# Patient Record
Sex: MALE | Race: WHITE | URBAN - METROPOLITAN AREA
[De-identification: names, ages, dates, MRNs, and addresses within clinical notes are randomized per-mention and may not be internally consistent; named-entity substitution may affect disease eponyms.]

---

## 2024-11-06 ENCOUNTER — TELEPHONE (OUTPATIENT)
Dept: FAMILY MEDICINE CLINIC | Age: 24
End: 2024-11-06

## 2024-11-06 NOTE — TELEPHONE ENCOUNTER
----- Message from Brandon QUEEN sent at 11/6/2024 11:48 AM EST -----  Regarding: .ECC Appointment Request  .ECC Appointment Request    Patient needs appointment for ECC Appointment Type: New Patient.    Patient Requested Dates(s): Anytime on January 2025  Patient Requested Time:preferably Afternoon  Provider Name:Em Turk MD    Reason for Appointment Request: New Patient - Requested Provider unavailable  --------------------------------------------------------------------------------------------------------------------------    Relationship to Patient: Self     Call Back Information: OK to leave message on voicemail  Preferred Call Back Number: Phone [phone]

## 2025-01-10 SDOH — HEALTH STABILITY: PHYSICAL HEALTH: ON AVERAGE, HOW MANY MINUTES DO YOU ENGAGE IN EXERCISE AT THIS LEVEL?: 60 MIN

## 2025-01-10 SDOH — HEALTH STABILITY: PHYSICAL HEALTH: ON AVERAGE, HOW MANY DAYS PER WEEK DO YOU ENGAGE IN MODERATE TO STRENUOUS EXERCISE (LIKE A BRISK WALK)?: 5 DAYS

## 2025-01-13 ENCOUNTER — OFFICE VISIT (OUTPATIENT)
Dept: FAMILY MEDICINE CLINIC | Age: 25
End: 2025-01-13
Payer: OTHER GOVERNMENT

## 2025-01-13 VITALS
BODY MASS INDEX: 25.67 KG/M2 | WEIGHT: 200 LBS | HEART RATE: 64 BPM | SYSTOLIC BLOOD PRESSURE: 120 MMHG | HEIGHT: 74 IN | OXYGEN SATURATION: 97 % | DIASTOLIC BLOOD PRESSURE: 80 MMHG

## 2025-01-13 DIAGNOSIS — M24.211 LIGAMENTOUS LAXITY OF RIGHT SHOULDER: ICD-10-CM

## 2025-01-13 DIAGNOSIS — G44.201 ACUTE INTRACTABLE TENSION-TYPE HEADACHE: Primary | ICD-10-CM

## 2025-01-13 DIAGNOSIS — M24.212 LIGAMENTOUS LAXITY OF LEFT SHOULDER: ICD-10-CM

## 2025-01-13 DIAGNOSIS — G47.9 SLEEPING DIFFICULTY: ICD-10-CM

## 2025-01-13 DIAGNOSIS — M25.562 POSTERIOR KNEE PAIN, LEFT: ICD-10-CM

## 2025-01-13 PROCEDURE — 99204 OFFICE O/P NEW MOD 45 MIN: CPT | Performed by: FAMILY MEDICINE

## 2025-01-13 RX ORDER — TRAZODONE HYDROCHLORIDE 50 MG/1
50 TABLET, FILM COATED ORAL NIGHTLY
Qty: 30 TABLET | Refills: 1 | Status: SHIPPED | OUTPATIENT
Start: 2025-01-13

## 2025-01-13 SDOH — ECONOMIC STABILITY: FOOD INSECURITY: WITHIN THE PAST 12 MONTHS, YOU WORRIED THAT YOUR FOOD WOULD RUN OUT BEFORE YOU GOT MONEY TO BUY MORE.: NEVER TRUE

## 2025-01-13 SDOH — ECONOMIC STABILITY: FOOD INSECURITY: WITHIN THE PAST 12 MONTHS, THE FOOD YOU BOUGHT JUST DIDN'T LAST AND YOU DIDN'T HAVE MONEY TO GET MORE.: NEVER TRUE

## 2025-01-13 ASSESSMENT — ENCOUNTER SYMPTOMS
ABDOMINAL PAIN: 0
SHORTNESS OF BREATH: 0
COUGH: 0
VOMITING: 0

## 2025-01-13 ASSESSMENT — PATIENT HEALTH QUESTIONNAIRE - PHQ9
2. FEELING DOWN, DEPRESSED OR HOPELESS: NOT AT ALL
SUM OF ALL RESPONSES TO PHQ9 QUESTIONS 1 & 2: 0
SUM OF ALL RESPONSES TO PHQ QUESTIONS 1-9: 0
1. LITTLE INTEREST OR PLEASURE IN DOING THINGS: NOT AT ALL
SUM OF ALL RESPONSES TO PHQ QUESTIONS 1-9: 0

## 2025-01-13 NOTE — PROGRESS NOTES
normal. No respiratory distress.      Breath sounds: Normal breath sounds.   Musculoskeletal:         General: No swelling or tenderness.      Right shoulder: No tenderness or crepitus. Normal range of motion. Normal strength.      Left shoulder: No tenderness or crepitus. Normal range of motion. Normal strength.      Cervical back: Neck supple.      Left knee: Normal.      Right lower leg: No edema.      Left lower leg: No edema.      Comments: +5/5 UE and LE strength Bilateral   Lt posterior knee tender after pt squatted for few seconds but no mass and non tender to palpate.      Skin:     Findings: No rash.   Neurological:      General: No focal deficit present.      Mental Status: He is alert and oriented to person, place, and time. Mental status is at baseline.      Cranial Nerves: No cranial nerve deficit.      Motor: No weakness.   Psychiatric:         Mood and Affect: Mood normal.         Behavior: Behavior normal.         Vitals:  /80   Pulse 64   Ht 1.88 m (6' 2\")   Wt 90.7 kg (200 lb)   SpO2 97%   BMI 25.68 kg/m²       Data:     No results found for: \"NA\", \"K\", \"CL\", \"CO2\", \"BUN\", \"CREATININE\", \"GLUCOSE\", \"LABALBU\", \"BILITOT\", \"ALKPHOS\", \"AST\", \"ALT\"  No results found for: \"WBC\", \"RBC\", \"HGB\", \"HCT\", \"MCV\", \"MCH\", \"MCHC\", \"RDW\", \"PLT\", \"MPV\"  No results found for: \"TSH\"  No results found for: \"CHOL\", \"LDL\", \"HDL\", \"PSA\", \"LABA1C\"       Assessment/Plan:        1. Acute intractable tension-type headache  Seems related to staring at computers daily but will have pt see optometry for complete eye exam then d/w pt needing some glasses with blue light protection to wear at work for his eyes if needed  - External Referral To Optometry    2. Ligamentous laxity of left shoulder  Exam shows normal rotator cuff and strength but per history sounds like laxity in his shoulders so would suggest pt see ortho for further work up and suggestions.   - External Referral To Orthopaedic Surgery    3. Ligamentous

## 2025-01-17 ENCOUNTER — HOSPITAL ENCOUNTER (OUTPATIENT)
Dept: GENERAL RADIOLOGY | Age: 25
Discharge: HOME | End: 2025-01-19
Payer: OTHER GOVERNMENT

## 2025-01-17 ENCOUNTER — HOSPITAL ENCOUNTER (OUTPATIENT)
Age: 25
Discharge: HOME | End: 2025-01-19
Payer: OTHER GOVERNMENT

## 2025-01-17 DIAGNOSIS — M25.512 PAIN, JOINT, SHOULDER, LEFT: ICD-10-CM

## 2025-01-17 DIAGNOSIS — M25.511 PAIN, JOINT, SHOULDER, RIGHT: ICD-10-CM

## 2025-01-17 DIAGNOSIS — M25.562 PAIN, JOINT, KNEE, LEFT: ICD-10-CM

## 2025-01-17 PROCEDURE — 73030 X-RAY EXAM OF SHOULDER: CPT

## 2025-01-17 PROCEDURE — 73564 X-RAY EXAM KNEE 4 OR MORE: CPT

## 2025-01-23 ENCOUNTER — TRANSCRIBE ORDERS (OUTPATIENT)
Dept: ADMINISTRATIVE | Age: 25
End: 2025-01-23

## 2025-01-23 DIAGNOSIS — M25.512 PAIN, JOINT, SHOULDER, LEFT: Primary | ICD-10-CM

## 2025-01-23 DIAGNOSIS — M25.511 PAIN, JOINT, SHOULDER, RIGHT: Primary | ICD-10-CM

## 2025-01-23 DIAGNOSIS — M25.562 PAIN, JOINT, KNEE, LEFT: ICD-10-CM

## 2025-01-23 DIAGNOSIS — M25.512 PAIN, JOINT, SHOULDER, LEFT: ICD-10-CM

## 2025-01-31 ENCOUNTER — HOSPITAL ENCOUNTER (OUTPATIENT)
Dept: MRI IMAGING | Age: 25
End: 2025-01-31
Payer: OTHER GOVERNMENT

## 2025-01-31 ENCOUNTER — HOSPITAL ENCOUNTER (OUTPATIENT)
Dept: MRI IMAGING | Age: 25
Discharge: HOME | End: 2025-01-31
Payer: OTHER GOVERNMENT

## 2025-01-31 DIAGNOSIS — M25.562 PAIN, JOINT, KNEE, LEFT: ICD-10-CM

## 2025-01-31 DIAGNOSIS — M25.512 PAIN, JOINT, SHOULDER, LEFT: ICD-10-CM

## 2025-01-31 DIAGNOSIS — M25.511 PAIN, JOINT, SHOULDER, RIGHT: ICD-10-CM

## 2025-01-31 PROCEDURE — 73721 MRI JNT OF LWR EXTRE W/O DYE: CPT

## 2025-01-31 PROCEDURE — 73221 MRI JOINT UPR EXTREM W/O DYE: CPT

## 2025-02-10 ENCOUNTER — OFFICE VISIT (OUTPATIENT)
Dept: FAMILY MEDICINE CLINIC | Age: 25
End: 2025-02-10
Payer: OTHER GOVERNMENT

## 2025-02-10 VITALS
WEIGHT: 203 LBS | DIASTOLIC BLOOD PRESSURE: 64 MMHG | BODY MASS INDEX: 26.06 KG/M2 | SYSTOLIC BLOOD PRESSURE: 102 MMHG | HEART RATE: 70 BPM | OXYGEN SATURATION: 98 %

## 2025-02-10 DIAGNOSIS — M25.562 POSTERIOR KNEE PAIN, LEFT: ICD-10-CM

## 2025-02-10 DIAGNOSIS — G44.201 ACUTE INTRACTABLE TENSION-TYPE HEADACHE: ICD-10-CM

## 2025-02-10 DIAGNOSIS — M24.211 LIGAMENTOUS LAXITY OF RIGHT SHOULDER: ICD-10-CM

## 2025-02-10 DIAGNOSIS — G47.9 SLEEPING DIFFICULTY: Primary | ICD-10-CM

## 2025-02-10 DIAGNOSIS — M24.212 LIGAMENTOUS LAXITY OF LEFT SHOULDER: ICD-10-CM

## 2025-02-10 PROCEDURE — 99213 OFFICE O/P EST LOW 20 MIN: CPT | Performed by: FAMILY MEDICINE

## 2025-02-10 RX ORDER — TRAZODONE HYDROCHLORIDE 50 MG/1
50 TABLET, FILM COATED ORAL NIGHTLY
Qty: 30 TABLET | Refills: 11 | Status: SHIPPED | OUTPATIENT
Start: 2025-02-10

## 2025-02-10 ASSESSMENT — ENCOUNTER SYMPTOMS
VOMITING: 0
NAUSEA: 0
ABDOMINAL PAIN: 0
SORE THROAT: 0
CHANGE IN BOWEL HABIT: 0

## 2025-02-10 NOTE — PROGRESS NOTES
HPI Notes    Name: Rudy Aguilar  : 2000        Chief Complaint:     Chief Complaint   Patient presents with    Insomnia     Patient here today for 4 week follow up. Taking trazodone 50 mg daily.     Shoulder Pain     4 week follow up. Patient will be seeing   for bilateral shoulder pain on 25. Had MRI's done per     Knee Pain     4 week follow up       History of Present Illness:     Rudy Aguilar is a 24 y.o.  male who presents with Insomnia (Patient here today for 4 week follow up. Taking trazodone 50 mg daily. ), Shoulder Pain (4 week follow up. Patient will be seeing   for bilateral shoulder pain on 25. Had MRI's done per ), and Knee Pain (4 week follow up)      Insomnia  This is a recurrent problem. The current episode started more than 1 year ago. Episode frequency: nightly. The problem has been gradually improving (pt is sleeping better on the trazodone. He still wakes up once at night but goes back to sleep much better. Pt feeling rested well on the trazodone.). Associated symptoms include arthralgias. Pertinent negatives include no abdominal pain, change in bowel habit, chest pain, congestion, fever, nausea, numbness, rash, sore throat or vomiting. Treatments tried: trazodone.   Shoulder Pain   The pain is present in the left shoulder. This is a chronic problem. The current episode started more than 1 month ago. There has been no history of extremity trauma. The problem occurs daily. The problem has been unchanged. Pertinent negatives include no fever, inability to bear weight, numbness or tingling. Treatments tried: pt has seen ortho and has f/u with Dr Goetz to review MRI results this Friday.   Knee Pain   There was no injury mechanism (pain with squatting only). Pertinent negatives include no inability to bear weight, muscle weakness, numbness or tingling. Treatments tried: pt has seen ortho Dr Goetz and seeing him again this

## 2025-03-03 ENCOUNTER — HOSPITAL ENCOUNTER (OUTPATIENT)
Dept: PHYSICAL THERAPY | Age: 25
Setting detail: THERAPIES SERIES
Discharge: HOME | End: 2025-03-03
Payer: OTHER GOVERNMENT

## 2025-03-03 PROCEDURE — 97161 PT EVAL LOW COMPLEX 20 MIN: CPT

## 2025-03-07 ENCOUNTER — HOSPITAL ENCOUNTER (OUTPATIENT)
Dept: PHYSICAL THERAPY | Age: 25
Setting detail: THERAPIES SERIES
Discharge: HOME | End: 2025-03-07
Payer: OTHER GOVERNMENT

## 2025-03-07 PROCEDURE — 97110 THERAPEUTIC EXERCISES: CPT

## 2025-03-07 NOTE — THERAPY EVALUATION
patient/guardian have any barriers to learning?: No barriers  What is the preferred language of the patient/guardian?: English    Goals  Short Term Goals  Time Frame for Short Term Goals: 3 visits  Short Term Goal 1: Educate on home program of specific rotator cuff and scapular strengthening    Long Term Goals  Time Frame for Long Term Goals : 10 visits  Long Term Goal 1: Decrease subjective shoulder instability by > 75%    Patient's Goal:   Decrease instability and subluxation    Timed Code Treatment Minutes: 0 Minutes   Total Time:  45 minutes  Time In: 0800  Time Out: 0845    PRABHA GALVAN, PT Date: 3/3/2025

## 2025-03-07 NOTE — PLAN OF CARE
[facility]       Phone: [phone]   Date: 3/3/2025                      Outpatient Physical Therapy  Fax: [phone]    ACCT #: [id number]                     Plan of Care  [facility]#: [id number]  Patient: Rudy Aguilar  : 2000    Referring Provider : Dr. Goetz      Diagnosis: Bilateral shoulder pain     Treatment Diagnosis: Shoulder weakness    Assessment  Assessment: Patient presents with history of bilateral shoulder instability and probably subluxation. Gross strength and ROM WNL. Plan to educate patient on specific rotator cuff and scapular strengthening.  Therapy Prognosis: Good    Treatment Plan :  Days: 2 (1-2x per week to upgrade HEP) times per week 8  weeks      Patient Education/HEP and Therapeutic Exercise     Goals  Short Term Goals  Time Frame for Short Term Goals: 3 visits  Short Term Goal 1: Educate on home program of specific rotator cuff and scapular strengthening  Long Term Goals  Time Frame for Long Term Goals : 10 visits  Long Term Goal 1: Decrease subjective shoulder instability by > 75%     PRABHA GALVAN PT   Date: 3/3/2025    ______________________________________ Date: 3/3/2025  Physician Signature  By signing above or cosigning electronically, I have reviewed this Plan of Care and certify a need for medically necessary rehabilitation services.

## 2025-03-07 NOTE — PROGRESS NOTES
Phone: [phone]                 [facility]      Fax: [phone]                            Outpatient Physical Therapy                                                                            Daily Note    Date: 3/7/2025  Patient Name: Rudy Aguilar        MRN: [id number]   ACCT#:  [id number]  : 2000  (24 y.o.)    Referring Provider (secondary): Dr. Goetz         Diagnosis: Bilateral shoulder pain  Treatment Diagnosis: Shoulder weakness    PT Insurance Information:  [employer]    Per Physician Order  Total # of Visits to Date: 2  No Show: 0  Canceled Appointment: 0  Plan of Care/Certification Expiration Date: 25    Pre-Treatment Pain:  0/10     Assessment  Assessment: Patient compliant with blue tband HEP.  Initiated additional scapular strengthening ex;  difficulty with prone over therapy ball T's and Y's using 3#.   Educated on HEP of blue tband hor abd and diagonals    Plan  Continue with current plan of care    Exercises/Modalities/Manual:  See DocFlow Sheet    Education: Blue tband hor abd and diagonals            Goals  (Total # of Visits to Date: 2)   Short Term Goals  Time Frame for Short Term Goals: 3 visits  Short Term Goal 1: Educate on home program of specific rotator cuff and scapular strengthening    Long Term Goals  Time Frame for Long Term Goals : 10 visits  Long Term Goal 1: Decrease subjective shoulder instability by > 75%    Treatment Tolerance:  Treatment Tolerance: Tolerated treatment well.    Post Treatment Pain:  0/10    Time In: 1340    Time Out : 1415        Timed Code Treatment Minutes: 35 Minutes    Total Time;  35 Minutes    PRABHA GALVAN PT     Date: 3/7/2025

## 2025-03-14 ENCOUNTER — HOSPITAL ENCOUNTER (OUTPATIENT)
Dept: PHYSICAL THERAPY | Age: 25
Setting detail: THERAPIES SERIES
Discharge: HOME | End: 2025-03-14
Payer: OTHER GOVERNMENT

## 2025-03-14 PROCEDURE — 97110 THERAPEUTIC EXERCISES: CPT

## 2025-03-14 NOTE — PROGRESS NOTES
Phone: [phone]                 [facility]      Fax: [phone]                            Outpatient Physical Therapy                                                                            Daily Note    Date: 3/14/2025  Patient Name: Rudy Aguilar        MRN: [id number]   ACCT#:  [id number]  : 2000  (24 y.o.)    Referring Provider (secondary): Dr. Goetz         Diagnosis: Bilateral shoulder pain  Treatment Diagnosis: Shoulder weakness    PT Insurance Information:  [employer]    Per Physician Order  Total # of Visits to Date: 3  No Show: 0  Canceled Appointment: 0  Plan of Care/Certification Expiration Date: 25    Pre-Treatment Pain:  0/10     Assessment  Assessment: Compliant with tband HEP.  Added tband IR.   Patient notes only fatigue with exercises; experiences no pain.  No episodes of subluxation however also states he avoids doing movements that may trigger it such as full ER or forced ER    Plan  Continue with current plan of care x1 visit and then determine hold vs discharge    Exercises/Modalities/Manual:  See DocFlow Sheet    Education: Tband IR            Goals  (Total # of Visits to Date: 3)   Short Term Goals  Time Frame for Short Term Goals: 3 visits  Short Term Goal 1: Educate on home program of specific rotator cuff and scapular strengthening- MET    Long Term Goals  Time Frame for Long Term Goals : 10 visits  Long Term Goal 1: Decrease subjective shoulder instability by > 75%    Treatment Tolerance:  Treatment Tolerance: Tolerated treatment well.    Post Treatment Pain:  0/10    Time In: 0900    Time Out : 0935        Timed Code Treatment Minutes: 35 Minutes    Total TIme:  35 Minutes    PRABHA GALVAN PT     Date: 3/14/2025

## 2025-03-27 ENCOUNTER — HOSPITAL ENCOUNTER (OUTPATIENT)
Dept: PHYSICAL THERAPY | Age: 25
Setting detail: THERAPIES SERIES
Discharge: HOME | End: 2025-03-27
Payer: OTHER GOVERNMENT

## 2025-03-27 NOTE — PROGRESS NOTES
Physical Therapy  [facility]  [facility]    Date: 3/27/2025  Patient Name: Rudy Aguilar        : 2000       Patient just came back from [place], last night and forgot his appointment.  He has rescheduled.      Maddie S Shock Date: 3/27/2025

## 2025-03-31 ENCOUNTER — HOSPITAL ENCOUNTER (OUTPATIENT)
Dept: PHYSICAL THERAPY | Age: 25
Setting detail: THERAPIES SERIES
Discharge: HOME | End: 2025-03-31
Payer: OTHER GOVERNMENT

## 2025-03-31 PROCEDURE — 97110 THERAPEUTIC EXERCISES: CPT

## 2025-03-31 NOTE — PROGRESS NOTES
Phone: [phone]                 [facility]      Fax: [phone]                            Outpatient Physical Therapy                                                                            Daily Note    Date: 3/31/2025  Patient Name: Rudy Aguilar        MRN: [id number]   ACCT#:  [id number]  : 2000  (24 y.o.)    Referring Provider (secondary): Dr. Goetz         Diagnosis: Bilateral shoulder pain  Treatment Diagnosis: Shoulder weakness    PT Insurance Information:  [facility]    Per Physician Order  Total # of Visits to Date: 4  No Show: 1  Canceled Appointment: 0  Plan of Care/Certification Expiration Date: 25    Pre-Treatment Pain:  0/10     Assessment  Assessment: Patient denies shoulder pain this morning. Patient reports he was trying to catch cat over the weekend because it had something in its mouth and when he went to grab for it her felt some discomfort/instability. He was reaching out and back when this happened. Progressed shoulder and scapular strengthening exercises per flowsheet. Patient reports no increased pain or instability throughout session. Educated patient on serratus wall walks and high plank sidesteps for HEP.    Plan  Continue with current plan of care    Exercises/Modalities/Manual:  See DocFlow Sheet    Education: Serratus wall walks and high plank step ups    Goals  (Total # of Visits to Date: 4)   Short Term Goals  Time Frame for Short Term Goals: 3 visits  Short Term Goal 1: Educate on home program of specific rotator cuff and scapular strengthening- MET    Long Term Goals  Time Frame for Long Term Goals : 10 visits  Long Term Goal 1: Decrease subjective shoulder instability by > 75%    Treatment Tolerance:  Treatment Tolerance: Tolerated treatment well.    Post Treatment Pain:  0/10    Time In: 1035    Time Out : 1115   Timed Code Treatment Minutes: 40 Minutes  Total Treatment Time: 40 Minutes    Jaime Avila PTA     Date: 3/31/2025

## 2025-04-08 ENCOUNTER — HOSPITAL ENCOUNTER (OUTPATIENT)
Dept: PHYSICAL THERAPY | Age: 25
Setting detail: THERAPIES SERIES
Discharge: HOME | End: 2025-04-08
Payer: OTHER GOVERNMENT

## 2025-04-08 PROCEDURE — 97110 THERAPEUTIC EXERCISES: CPT

## 2025-04-08 NOTE — PROGRESS NOTES
Phone: [phone]                 [facility]      Fax: [phone]                            Outpatient Physical Therapy                                                                            Daily Note    Date: 2025  Patient Name: Rudy Aguilar        MRN: [id number]   ACCT#:  [id number]  : 2000  (24 y.o.)    Referring Provider (secondary): Dr. Goetz         Diagnosis: Bilateral shoulder pain  Treatment Diagnosis: Shoulder weakness    PT Insurance Information: [employer] [facility]  Total # of Visits Approved: 10 Per Physician Order  Total # of Visits to Date: 5  No Show: 1  Canceled Appointment: 0  Plan of Care/Certification Expiration Date: 25    Pre-Treatment Pain:  0/10     Assessment  Assessment: Patient denies B/L shoulder pain this morning. Following last session patient notes both shoulders were pretty tender, but tolerable. Continued with shoulder and scapular strengthening exercises per flowsheet. Patient continues to deny pain throughout sessions. Patient feels his shoulders are getting stronger as he hasn't had much instability feeling lately. The only time he notices this feeling is if he does something quick away from his body. Patient to follow up with Dr. Goetz on .    Plan  Continue with current plan of care    Exercises/Modalities/Manual:  See DocFlow Sheet    Education: HEP    Goals  (Total # of Visits to Date: 5)   Short Term Goals  Time Frame for Short Term Goals: 3 visits  Short Term Goal 1: Educate on home program of specific rotator cuff and scapular strengthening - MET    Long Term Goals  Time Frame for Long Term Goals : 10 visits  Long Term Goal 1: Decrease subjective shoulder instability by > 75%    Treatment Tolerance:  Treatment Tolerance: Tolerated treatment well.    Post Treatment Pain:  0/10    Time In: 0802    Time Out : 0842   Timed Code Treatment Minutes: 40 Minutes  Total Treatment Time: 40 Minutes    Jaime Avila PTA     Date: 2025

## 2025-04-14 ENCOUNTER — HOSPITAL ENCOUNTER (OUTPATIENT)
Dept: PHYSICAL THERAPY | Age: 25
Setting detail: THERAPIES SERIES
Discharge: HOME | End: 2025-04-14
Payer: OTHER GOVERNMENT

## 2025-04-14 PROCEDURE — 97110 THERAPEUTIC EXERCISES: CPT

## 2025-04-14 NOTE — PROGRESS NOTES
Phone: [phone]                 [facility]      Fax: [phone]                            Outpatient Physical Therapy                                                                            Daily Note    Date: 2025  Patient Name: Rudy Aguilar        MRN: [id number]   ACCT#:  [id number]  : 2000  (24 y.o.)    Referring Provider (secondary): Dr. Goetz         Diagnosis: Bilateral shoulder pain  Treatment Diagnosis: Shoulder weakness    PT Insurance Information:  [employer]  Total # of Visits Approved: 10 Per Physician Order  Total # of Visits to Date: 6  No Show: 1  Canceled Appointment: 0  Plan of Care/Certification Expiration Date: 25    Pre-Treatment Pain:  0/10     Assessment  Assessment: Patient continues to deny shoulder pain this morning. Patient reports changing countertops at home over the weekend without increased pain or shoulder instability. Progressed strengthening ex per flowsheet with good tolerance from patient. Post session patient notes no pain, but does have some muscle soreness. Patient to follow up with Dr. Goetz next Friday ().    Plan  Continue with current plan of care    Exercises/Modalities/Manual:  See DocFlow Sheet    Education: Exercise form    Goals  (Total # of Visits to Date: 6)   Short Term Goals  Time Frame for Short Term Goals: 3 visits  Short Term Goal 1: Educate on home program of specific rotator cuff and scapular strengthening - MET    Long Term Goals  Time Frame for Long Term Goals : 10 visits  Long Term Goal 1: Decrease subjective shoulder instability by > 75%    Treatment Tolerance:  Treatment Tolerance: Tolerated treatment well.    Post Treatment Pain:  0/10    Time In: 0900    Time Out : 0940   Timed Code Treatment Minutes: 40 Minutes  Total Treatment Time: 40 Minutes    Jaime Avila PTA     Date: 2025

## 2025-04-21 ENCOUNTER — HOSPITAL ENCOUNTER (OUTPATIENT)
Dept: PHYSICAL THERAPY | Age: 25
Setting detail: THERAPIES SERIES
Discharge: HOME | End: 2025-04-21
Payer: OTHER GOVERNMENT

## 2025-04-21 PROCEDURE — 97110 THERAPEUTIC EXERCISES: CPT

## 2025-04-21 NOTE — PROGRESS NOTES
Phone: [phone]                 [facility]      Fax: [phone]                            Outpatient Physical Therapy                                                                            Daily Note    Date: 2025  Patient Name: Rudy Aguilar        MRN: [id number]   ACCT#:  [id number]  : 2000  (24 y.o.)    Referring Provider (secondary): Dr. Goetz         Diagnosis: Bilateral shoulder pain  Treatment Diagnosis: Shoulder weakness    PT Insurance Information:  [employer]  Total # of Visits Approved: 10 Per Physician Order  Total # of Visits to Date: 7  No Show: 1  Canceled Appointment: 0  Plan of Care/Certification Expiration Date: 25    Pre-Treatment Pain:  0/10     Assessment  Assessment: Patient has completed 7 tratment sessions consisting of strengthening and education on home program due to bilateral shoulder instability and subluxation.   Patient has progressed well and reports no issues with instability.   His AROM and strength and WNL.   He has been educated on a home program to address sccapular and posterior muscle strengthening.  Based on his current status, plan to hold further treatment until MD follow up on 25 and is no concerns per orthopedic MD will plan to discharge    Plan  Hold pending MD assessment; discharge following 25 if no additional concerns or orders.    Exercises/Modalities/Manual:  See DocFlow Sheet    Education: Reviewed HEP to complete at local gym            Goals  (Total # of Visits to Date: 7)   Short Term Goals  Time Frame for Short Term Goals: 3 visits  Short Term Goal 1: Educate on home program of specific rotator cuff and scapular strengthening - MET    Long Term Goals  Time Frame for Long Term Goals : 10 visits  Long Term Goal 1: Decrease subjective shoulder instability by > 75%- MET    Treatment Tolerance:  Treatment Tolerance: Tolerated treatment well.    Post Treatment Pain:  0/10    Time In: 0800    Time Out : 0845

## 2025-04-21 NOTE — PROGRESS NOTES
Phone: [phone]                  [facility]            Fax: [phone]                             Outpatient Physical Therapy                                                                      Progress Report    Date: 2025   MRN: [id number]    ACCT#: [id number]  Patient: Rudy Aguilar  : 2000  Referring Provider: Dr. Goetz           Diagnosis: Bilateral shoulder pain      Treatment Diagnosis: Shoulder weakness    PT Insurance Information: [employer] [facility]  Total # of Visits Approved: 10 Per Physician Order  Total # of Visits to Date: 7  No Show: 1  Canceled Appointment: 0    Assessment: Patient has completed 7 treatment sessions consisting of strengthening and education on home program due to bilateral shoulder instability and subluxation.   Patient has progressed well and reports no issues with instability.   His AROM and strength and WNL.   He has been educated on a home program to address sccapular and posterior muscle strengthening.  Based on his current status, plan to hold further treatment until MD follow up on 25; if not concerns, will discharge further PT with patient to continue on independent basis      Plan:  Hold pending MD assessment; discharge after 25 appointment if no additional concerns.    Goals  Short Term Goals  Time Frame for Short Term Goals: 3 visits  Short Term Goal 1: Educate on home program of specific rotator cuff and scapular strengthening - MET    Long Term Goals  Time Frame for Long Term Goals : 10 visits  Long Term Goal 1: Decrease subjective shoulder instability by > 75%- MET    PRABHA GALVAN, PT    Date: 2025